# Patient Record
Sex: FEMALE | Race: ASIAN | NOT HISPANIC OR LATINO | Employment: FULL TIME | URBAN - METROPOLITAN AREA
[De-identification: names, ages, dates, MRNs, and addresses within clinical notes are randomized per-mention and may not be internally consistent; named-entity substitution may affect disease eponyms.]

---

## 2021-09-02 ENCOUNTER — TRANSFERRED RECORDS (OUTPATIENT)
Dept: HEALTH INFORMATION MANAGEMENT | Facility: CLINIC | Age: 31
End: 2021-09-02

## 2021-09-02 LAB
HPV ABSTRACT: NORMAL
PAP-ABSTRACT: NORMAL

## 2022-10-03 ENCOUNTER — HEALTH MAINTENANCE LETTER (OUTPATIENT)
Age: 32
End: 2022-10-03

## 2022-11-29 ENCOUNTER — OFFICE VISIT (OUTPATIENT)
Dept: FAMILY MEDICINE | Facility: CLINIC | Age: 32
End: 2022-11-29
Payer: COMMERCIAL

## 2022-11-29 VITALS
BODY MASS INDEX: 37.61 KG/M2 | HEIGHT: 61 IN | HEART RATE: 66 BPM | RESPIRATION RATE: 16 BRPM | SYSTOLIC BLOOD PRESSURE: 144 MMHG | DIASTOLIC BLOOD PRESSURE: 97 MMHG | OXYGEN SATURATION: 98 % | TEMPERATURE: 98.4 F | WEIGHT: 199.2 LBS

## 2022-11-29 DIAGNOSIS — I10 ESSENTIAL HYPERTENSION: ICD-10-CM

## 2022-11-29 DIAGNOSIS — E66.01 CLASS 2 SEVERE OBESITY WITH SERIOUS COMORBIDITY AND BODY MASS INDEX (BMI) OF 37.0 TO 37.9 IN ADULT, UNSPECIFIED OBESITY TYPE (H): Primary | ICD-10-CM

## 2022-11-29 DIAGNOSIS — E66.812 CLASS 2 SEVERE OBESITY WITH SERIOUS COMORBIDITY AND BODY MASS INDEX (BMI) OF 37.0 TO 37.9 IN ADULT, UNSPECIFIED OBESITY TYPE (H): Primary | ICD-10-CM

## 2022-11-29 LAB — HBA1C MFR BLD: 5.6 % (ref 0–5.6)

## 2022-11-29 PROCEDURE — 36415 COLL VENOUS BLD VENIPUNCTURE: CPT | Performed by: FAMILY MEDICINE

## 2022-11-29 PROCEDURE — 99205 OFFICE O/P NEW HI 60 MIN: CPT | Performed by: FAMILY MEDICINE

## 2022-11-29 PROCEDURE — 83036 HEMOGLOBIN GLYCOSYLATED A1C: CPT | Performed by: FAMILY MEDICINE

## 2022-11-29 RX ORDER — DILTIAZEM HYDROCHLORIDE 240 MG/1
240 CAPSULE, EXTENDED RELEASE ORAL DAILY
COMMUNITY
Start: 2021-07-07

## 2022-11-29 RX ORDER — TRAZODONE HYDROCHLORIDE 50 MG/1
TABLET, FILM COATED ORAL
COMMUNITY
Start: 2021-07-09 | End: 2023-04-24

## 2022-11-29 RX ORDER — CHLORTHALIDONE 25 MG/1
25 TABLET ORAL DAILY
COMMUNITY
Start: 2021-07-07 | End: 2023-04-24

## 2022-11-29 NOTE — PROGRESS NOTES
"    New Medical Weight Management Consult    PATIENT:  Fortunato Dukes  MRN:         [id number]  :         1990  ROSHAN:         2022    Primary PCP in  system,    I had the pleasure of seeing your patient, Fortunato Dukes. Full intake/assessment was done to determine barriers to weight loss success and develop a treatment plan. Fortunato Dukes is a 32 year old female interested in treatment of medical problems associated with excess weight. She has a height of 5' 1.25\", a weight of 199 lbs 3.2 oz, and the calculated Body mass index is 37.33 kg/m .    ASSESSMENT/PLAN:  1. Class 2 severe obesity with serious comorbidity and body mass index (BMI) of 37.0 to 37.9 in adult, unspecified obesity type (H)  Pa presents for comprehensive weight management intake today.  Her comorbidities include hypertension.  She was able to set some long-term and short-term goals as discussed below.  I did check an A1c and this was in the normal range.  We discussed medication options and as she has uncontrolled hypertension, phentermine would currently be contraindicated.  She is already taking Wellbutrin and does not want to increase the dose as she feels like it started giving her headaches.  I recommended a GLP-1 and will send in Saxenda as Wegovy is still on backorder.  Discussed potential adverse side effects including nausea and pancreatitis.  She will then follow-up in 4 to 6 weeks.  - Hemoglobin A1c; Future  - liraglutide - Weight Management (SAXENDA) 18 MG/3ML pen; Inject 0.6 mg Subcutaneous daily  Dispense: 9 mL; Refill: 0  - Hemoglobin A1c    2. Essential hypertension  This is managed by her PCP in the [facility].  They have been making some adjustments to her medications.    Pa has a long-term goal weight of 180 pounds.  She was able to set some short-term goals in the following categories:  Behavioral: She is going to start tracking her intake using a phone rex.  She is not getting adequate sleep and is working with " her primary care provider regarding some insomnia.  Nutritional: We discussed trying to keep carbs under 125 and proteins around 60-70.  She is currently coming in at about 1600 naresh/day and may need to cut that down more to 1400 if she wants to have more of a deficit.  Activity: Her activity level is excellent and no changes needed there      Pa [facility]  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  She has 6 children.  Her last delivery was in 2014.  She feels like that is when she gained some weight with that last pregnancy and just had a hard time getting it off.  She also lives with her parents.  She does the grocery shopping and the food prep.  She states that she had been on phentermine in the past and lost about 10 pounds with it.  She has been working on blood pressure medications with her primary care physician.  She started on Wellbutrin about 2 months ago for some depression and she feels like it gives her headaches.  As far as her dietary intake is as follows:  Breakfast: Coffee with protein  She usually skips lunch but may have some rice crackers  Dinner: Traditional Asian meal with rice, protein, veggies.  She states she is measuring her rice and limiting it to 1 cup.  Snacking: She states she does not do a lot of snacking but sometimes has a salt craving.  She has been tracking her intake on a calorie rex and is coming in around 1600 naresh/day.  However, she has been adding in the extra calories that they give her for her exercise.  She has too many sodas per week.  She does not drink alcohol  As far as activity level, [facility]  She will do 30 to 45 minutes of cardio and then has been doing some weight training as well.  She does not feel that she does a lot of disordered eating.  She screens negative for binge eating.  She is only getting about 6 hours of sleep per  "night.  She states this is mainly some insomnia and she has been working with her primary care physician.  She is currently taking trazodone.  She states if she goes to bed earlier she just wakes up earlier.  She does have some snoring but screens negative for sleep apnea otherwise.  She is not planning any further pregnancies at this time.  We discussed she needs to have reliable birth control while she is on any weight loss medications.  She has the following co-morbidities:       11/28/2022   I have the following health issues associated with obesity: High Blood Pressure   I have the following symptoms associated with obesity: Fatigue       Patient Goals 11/28/2022   I am interested in having a healthier weight to diminish current health problems: Yes   I am interested in having a healthier weight in order to prevent future health problems: Yes   I am interested in having a healthier weight in order to have a future surgery: No       Referring Provider 11/28/2022   Please name the provider who referred you to Medical Weight Management.  If you do not know, please answer: \"I Don't Know\". krystal gomez       Weight History 11/28/2022   How concerned are you about your weight? Somewhat Concerned   Would you describe your weight gain as gradual? Yes   I became overweight: As a Teenager   The following factors have contributed to my weight gain:  Mental Health Issues, Eating Wrong Types of Food, Genetic (Runs in the Family), Stress   I have tried the following methods to lose weight: Watching Portions or Calories, Exercise, Medications, Fasting   I have the following family history of obesity/being overweight:  One or more of my siblings are overweight   Has anyone in your family had weight loss surgery? Yes   How has your weight changed over the last year?  Gained   How many pounds? 20       Diet Recall Review with Patient 11/28/2022   Do you typically eat lunch? Yes   If you do eat lunch, what types of food do you " typically eat?  Chicken, rice, some vegetables, sandwiches, protein shake   Do you typically eat supper? Yes   If you do eat supper, what types of food do you typically eat? Rice, some protein meat, vegetables   Do you typically eat snacks? Yes   If you do snack, what types of food do you typically eat? Rice crackers, fruit, sometimes chips   Do you like vegetables?  Yes   Do you drink water? Yes   How many glasses of juice do you drink in a typical day? 1   How many of glasses of milk do you drink in a typical day? 1   If you do drink milk, what type? N/A   How many 8oz glasses of sugar containing drinks such as August-Aid/sweet tea do you drink in a day? 0   How many cans/bottles of sugar pop/soda/tea/sports drinks do you drink in a day? 1   How many cans/bottles of diet pop/soda/tea or sports drink do you drink in a day? 2   How often do you have a drink of alcohol? Monthly or Less       Eating Habits 11/28/2022   Generally, my meals include foods like these: bread, pasta, rice, potatoes, corn, crackers, sweet dessert, pop, or juice. Everyday   Generally, my meals include foods like these: fried meats, brats, burgers, french fries, pizza, cheese, chips, or ice cream. Once a Week   Eat fast food (like McDonalds, [employer], Taco Bell). Once a Week   Eat at a buffet or sit-down restaurant. Less Than Weekly   Eat most of my meals in front of the TV or computer. Almost Everyday   Often skip meals, eat at random times, have no regular eating times. A Few Times a Week   Rarely sit down for a meal but snack or graze throughout.  Once a Week   Eat extra snacks between meals. Once a Week   Eat most of my food at the end of the day. Once a Week   Eat in the middle of the night or wake up at night to eat. Never   Eat extra snacks to prevent or correct low blood sugar. Never   Eat to prevent acid reflux or stomach pain. Never   Worry about not having enough food to eat. Never   Have you been to the food shelf at least a few  times this year? No   I eat when I am depressed. Never   I eat when I am stressed. Never   I eat when I am bored. Less Than Weekly   I eat when I am anxious. Never   I eat when I am happy or as a reward. Never   I feel hungry all the time even if I just have eaten. A Few Times a Week   Feeling full is important to me. Less Than Weekly   I finish all the food on my plate even if I am already full. Never   I can't resist eating delicious food or walk past the good food/smell. Never   I eat/snack without noticing that I am eating. Never   I eat when I am preparing the meal. Less Than Weekly   I eat more than usual when I see others eating. Never   I have trouble not eating sweets, ice cream, cookies, or chips if they are around the house. Never   I think about food all day. Less Than Weekly   What foods, if any, do you crave? Chips/Crackers       Amount of Food 11/28/2022   I make myself vomit what I have eaten or use laxatives to get rid of food. Monthly   I eat a large amount of food, like a loaf of bread, a box of cookies, a pint/quart of ice cream, all at once. Never   I eat a large amount of food even when I am not hungry. Never   I eat rapidly. Never   I eat alone because I feel embarrassed and do not want others to see how much I have eaten. Never   I eat until I am uncomfortably full. Never   I feel bad, disgusted, or guilty after I overeat. Monthly   I make myself vomit what I have eaten or use laxatives to get rid of food. Monthly       Activity/Exercise History 11/28/2022   How much of a typical 12 hour day do you spend sitting? Less Than Half the Day   How much of a typical 12 hour day do you spend lying down? Less Than Half the Day   How much of a typical day do you spend walking/standing? Half the Day   How many hours (not including work) do you spend on the TV/Video Games/Computer/Tablet/Phone? 2-3 Hours   How many times a week are you active for the purpose of exercise? 4-5 TImes a Week   What keeps you  from being more active? Lack of Time   How many total minutes do you spend doing some activity for the purpose of exercising when you exercise? More Than 30 Minutes       PAST MEDICAL HISTORY:  No past medical history on file.    Work/Social History Reviewed With Patient 11/28/2022   My employment status is: Full-Time   My job is: MA   How much of your job is spent on the computer or phone? 75%   How many hours do you spend commuting to work daily?  1 hour   What is your marital status? /In a Relationship   If in a relationship, is your significant other overweight? No   Do you have children? Yes   If you have children, are they overweight? Yes   Who do you live with?  Both parents (me)   Are they supportive of your health goals? Yes   Who does the food shopping?  Myself       Mental Health History Reviewed With Patient 11/28/2022   Have you ever been physically or sexually abused? No   If yes, do you feel that the abuse is affecting your weight? No   If yes, would you like to talk to a counselor about the abuse? No   How often in the past 2 weeks have you felt little interest or pleasure in doing things? For Several Days   Over the past 2 weeks how often have you felt down, depressed, or hopeless? For Several Days       Sleep History Reviewed With Patient 11/28/2022   How many hours do you sleep at night? 6   Do you think that you snore loudly or has anybody ever heard you snore loudly (louder than talking or so loud it can be heard behind a shut door)? Yes   Has anyone seen or heard you stop breathing during your sleep? No   Do you often feel tired, fatigued, or sleepy during the day? Yes   Do you have a TV/Computer in your bedroom? No       MEDICATIONS:   Current Outpatient Medications   Medication Sig Dispense Refill     buPROPion HCl (WELLBUTRIN PO) Unsure of dose       chlorthalidone (HYGROTON) 25 MG tablet Take 25 mg by mouth daily       diltiazem ER (DILT-XR) 240 MG 24 hr ER beaded capsule Take 240 mg  "by mouth daily       traZODone (DESYREL) 50 MG tablet As needed         ALLERGIES:   No Known Allergies    PHYSICAL EXAM:  BP (!) 144/97   Pulse 66   Temp 98.4  F (36.9  C)   Resp 16   Ht 1.556 m (5' 1.25\")   Wt 90.4 kg (199 lb 3.2 oz)   LMP  (LMP Unknown)   SpO2 98%   BMI 37.33 kg/m      Waist circumference:      Wt Readings from Last 4 Encounters:   11/29/22 90.4 kg (199 lb 3.2 oz)     A & O x 3  HEENT: NCAT, mucous membranes moist  Respirations unlabored  Location of obesity: Mixed Obesity    FOLLOW-UP:   6 weeks.    TIME: 65 min spent on evaluation, management, counseling, education, & motivational interviewing with greater than 50 % of the total time was spent on counseling and coordinating care    Sincerely,    Karla Nix MD      "

## 2022-11-30 DIAGNOSIS — E66.812 CLASS 2 SEVERE OBESITY WITH SERIOUS COMORBIDITY AND BODY MASS INDEX (BMI) OF 37.0 TO 37.9 IN ADULT, UNSPECIFIED OBESITY TYPE (H): ICD-10-CM

## 2022-11-30 DIAGNOSIS — E66.01 CLASS 2 SEVERE OBESITY WITH SERIOUS COMORBIDITY AND BODY MASS INDEX (BMI) OF 37.0 TO 37.9 IN ADULT, UNSPECIFIED OBESITY TYPE (H): ICD-10-CM

## 2022-12-05 ENCOUNTER — TELEPHONE (OUTPATIENT)
Dept: FAMILY MEDICINE | Facility: CLINIC | Age: 32
End: 2022-12-05

## 2022-12-05 NOTE — TELEPHONE ENCOUNTER
PA needed for Saxenda 6mg/ml - 0.6mg subcutaneous once daily - 15ml    [facility]  ID: [id number]    [phone]

## 2022-12-06 NOTE — TELEPHONE ENCOUNTER
Prior Authorization Approval    Authorization Effective Date: 11/6/2022  Authorization Expiration Date: 4/5/2023  Medication: SAXENDA 18 MG/3ML pen- APPROVED   Approved Dose/Quantity:   Reference #:     Insurance Company: [employer] - Phone [phone] Fax [phone]  Expected CoPay:        CoPay Card Available:      Foundation Assistance Needed:    Which Pharmacy is filling the prescription (Not needed for infusion/clinic administered): [facility] [address] [place] [address]  Pharmacy Notified: Yes  Patient Notified:  **Instructed pharmacy to notify patient when script is ready to /ship.**

## 2023-01-10 ENCOUNTER — OFFICE VISIT (OUTPATIENT)
Dept: FAMILY MEDICINE | Facility: CLINIC | Age: 33
End: 2023-01-10
Payer: COMMERCIAL

## 2023-01-10 VITALS
DIASTOLIC BLOOD PRESSURE: 93 MMHG | BODY MASS INDEX: 37.46 KG/M2 | HEART RATE: 66 BPM | RESPIRATION RATE: 16 BRPM | SYSTOLIC BLOOD PRESSURE: 141 MMHG | HEIGHT: 61 IN | TEMPERATURE: 98.7 F | WEIGHT: 198.4 LBS | OXYGEN SATURATION: 99 %

## 2023-01-10 DIAGNOSIS — E66.01 CLASS 2 SEVERE OBESITY WITH SERIOUS COMORBIDITY AND BODY MASS INDEX (BMI) OF 37.0 TO 37.9 IN ADULT, UNSPECIFIED OBESITY TYPE (H): ICD-10-CM

## 2023-01-10 DIAGNOSIS — E66.812 CLASS 2 SEVERE OBESITY WITH SERIOUS COMORBIDITY AND BODY MASS INDEX (BMI) OF 37.0 TO 37.9 IN ADULT, UNSPECIFIED OBESITY TYPE (H): ICD-10-CM

## 2023-01-10 PROCEDURE — 99214 OFFICE O/P EST MOD 30 MIN: CPT | Performed by: FAMILY MEDICINE

## 2023-01-10 RX ORDER — BUPROPION HYDROCHLORIDE 150 MG/1
150 TABLET ORAL EVERY MORNING
COMMUNITY
Start: 2023-01-02 | End: 2023-04-24

## 2023-01-10 NOTE — PROGRESS NOTES
"    Return Medical Weight Management Note     Fortunato Dukes  MRN:  [id number]  :  1990  ROSHAN:  1/10/2023           2022   I have the following health issues associated with obesity: High Blood Pressure   I have the following symptoms associated with obesity: Fatigue       INTERVAL HISTORY:  Pa presents for comprehensive weight management follow-up.  When I saw her at the end of November she weighed 199 and today she is 199 again.  She was able to get Saxenda covered and is at the 2.4 her milligrams dose.  She has felt like it is helped with cravings.  She has been tracking sometimes, but not on a regular basis.  She is overall trying to cut back on her rice.  She admits through the holidays she was eating more sweets.  She is now working out regularly.  She is not having any nausea from the medication.  She really has not noticed a whole lot of difference as of yet.  We discussed staying at the max dose for 12 weeks to see if she has a response.    CURRENT WEIGHT:   198 lbs 6.4 oz                  No flowsheet data found.    VITALS:  BP (!) 141/93   Pulse 66   Temp 98.7  F (37.1  C)   Resp 16   Ht 1.556 m (5' 1.25\")   Wt 90 kg (198 lb 6.4 oz)   LMP  (LMP Unknown)   SpO2 99%   BMI 37.18 kg/m      MEDICATIONS:   Current Outpatient Medications   Medication Sig Dispense Refill     liraglutide - Weight Management (SAXENDA) 18 MG/3ML pen Inject 3 mg Subcutaneous daily 45 mL 0     buPROPion (WELLBUTRIN XL) 150 MG 24 hr tablet 150 mg every morning       chlorthalidone (HYGROTON) 25 MG tablet Take 25 mg by mouth daily       diltiazem ER (DILT-XR) 240 MG 24 hr ER beaded capsule Take 240 mg by mouth daily       insulin pen needle (32G X 6 MM) 32G X 6 MM miscellaneous Use one  pen needles daily or as directed. 90 each 3     traZODone (DESYREL) 50 MG tablet As needed         No flowsheet data found.    ASSESSMENT/Plan  1. Class 2 severe obesity with serious comorbidity and body mass index (BMI) of 37.0 to 37.9 " in adult, unspecified obesity type (H)  Pa was able to get Saxenda covered.  We will increase to the maximum dose and have her follow-up in 8 weeks.  She will continue to focus on diet and activity.  - liraglutide - Weight Management (SAXENDA) 18 MG/3ML pen; Inject 3 mg Subcutaneous daily  Dispense: 45 mL; Refill: 0        FOLLOW-UP:    8 weeks.        Sincerely,    Karla Nix MD

## 2023-03-13 ENCOUNTER — OFFICE VISIT (OUTPATIENT)
Dept: FAMILY MEDICINE | Facility: CLINIC | Age: 33
End: 2023-03-13
Payer: COMMERCIAL

## 2023-03-13 VITALS
BODY MASS INDEX: 37.76 KG/M2 | SYSTOLIC BLOOD PRESSURE: 142 MMHG | RESPIRATION RATE: 16 BRPM | WEIGHT: 200 LBS | HEIGHT: 61 IN | HEART RATE: 65 BPM | OXYGEN SATURATION: 100 % | DIASTOLIC BLOOD PRESSURE: 85 MMHG

## 2023-03-13 DIAGNOSIS — E66.812 CLASS 2 SEVERE OBESITY WITH SERIOUS COMORBIDITY AND BODY MASS INDEX (BMI) OF 37.0 TO 37.9 IN ADULT, UNSPECIFIED OBESITY TYPE (H): Primary | ICD-10-CM

## 2023-03-13 DIAGNOSIS — E66.01 CLASS 2 SEVERE OBESITY WITH SERIOUS COMORBIDITY AND BODY MASS INDEX (BMI) OF 37.0 TO 37.9 IN ADULT, UNSPECIFIED OBESITY TYPE (H): Primary | ICD-10-CM

## 2023-03-13 PROCEDURE — 99214 OFFICE O/P EST MOD 30 MIN: CPT | Performed by: FAMILY MEDICINE

## 2023-03-13 NOTE — PROGRESS NOTES
"    Return Medical Weight Management Note     Fortunato Dukes  MRN:  [id number]  :  1990  ROSHAN:  3/13/2023        I had the pleasure of seeing  Fortunato Dukes. She is a 32 year old female who I am continuing to see for treatment of obesity related to:       2022   I have the following health issues associated with obesity: High Blood Pressure   I have the following symptoms associated with obesity: Fatigue       INTERVAL HISTORY:  Pa returns for comprehensive weight management follow-up.  Her initial weight when I first started seeing her in November was 199.  When I saw her last time she was 199 and today she is 200.  She is little bit frustrated by this as she feels like she is doing all the right things.  She feels like her nutrition is pretty good.  She is doing a protein shake in the morning and then just some raw vegetables during the day.  She will then have her main meal of the day when she gets home.  This tends to be a traditional  meal but she is really cut back on the rice.  She has been working out quite frequently and actually started to do some weight lifting.  We discussed this could account for the lack of weight loss on the scale.  She does feel like her clothes are little bit looser and people are commenting that she is losing weight.  She is now been on the maximum dose of Saxenda for 6 weeks and did not have any additional weight loss.  In the past she is phentermine but as she has high blood pressure, would not recommend that at this time.  As we have better supply chain on the Wegovy it might be worth switching over if her insurance covers it to see if she has more success with this.    CURRENT WEIGHT:   200 lbs 0 oz  Wt Readings from Last 4 Encounters:   23 90.7 kg (200 lb)   01/10/23 90 kg (198 lb 6.4 oz)   22 90.4 kg (199 lb 3.2 oz)                   No flowsheet data found.    VITALS:  BP (!) 142/85   Pulse 65   Resp 16   Ht 1.556 m (5' 1.25\")   Wt 90.7 kg (200 lb) "   LMP  (LMP Unknown)   SpO2 100%   BMI 37.48 kg/m      MEDICATIONS:   Current Outpatient Medications   Medication Sig Dispense Refill     Semaglutide-Weight Management (WEGOVY) 1 MG/0.5ML pen Inject 1 mg Subcutaneous once a week 2 mL 1     buPROPion (WELLBUTRIN XL) 150 MG 24 hr tablet 150 mg every morning       chlorthalidone (HYGROTON) 25 MG tablet Take 25 mg by mouth daily       diltiazem ER (DILT-XR) 240 MG 24 hr ER beaded capsule Take 240 mg by mouth daily       insulin pen needle (32G X 6 MM) 32G X 6 MM miscellaneous Use one  pen needles daily or as directed. 90 each 3     liraglutide - Weight Management (SAXENDA) 18 MG/3ML pen Inject 3 mg Subcutaneous daily 45 mL 0     traZODone (DESYREL) 50 MG tablet As needed         No flowsheet data found.    ASSESSMENT/Plan  1. Class 2 severe obesity with serious comorbidity and body mass index (BMI) of 37.0 to 37.9 in adult, unspecified obesity type (H)  As she did not have any weight loss with the maximum dose of Saxenda for 6 weeks, will see if we can switch over to Wegovy if her insurance covers it.  She is made a lot of lifestyle change with her nutrition and physical activity.  We will plan to follow-up in about 6 weeks.  - Semaglutide-Weight Management (WEGOVY) 1 MG/0.5ML pen; Inject 1 mg Subcutaneous once a week  Dispense: 2 mL; Refill: 1      FOLLOW-UP:    6 weeks.        Sincerely,    Karla Nix MD

## 2023-03-14 ENCOUNTER — TELEPHONE (OUTPATIENT)
Dept: FAMILY MEDICINE | Facility: CLINIC | Age: 33
End: 2023-03-14
Payer: COMMERCIAL

## 2023-03-14 NOTE — TELEPHONE ENCOUNTER
PA needed for Wegovy 1mg/0.5ml auto-injectors    [facility]MyMeds  [facility]  [place]    [place]: [id number]

## 2023-03-16 NOTE — TELEPHONE ENCOUNTER
Central Prior Authorization Team   Phone: [phone]    PA Initiation    Medication: Wegovy 1MG/0.5ML auto-injectors  Insurance Company: [employer] - Phone [phone] Fax [phone]  Pharmacy Filling the Rx: [facility] [address] [place] [address]  Filling Pharmacy Phone: [phone]  Filling Pharmacy Fax:    Start Date: 3/16/2023

## 2023-03-20 NOTE — TELEPHONE ENCOUNTER
Prior Authorization Approval    Authorization Effective Date: 2/14/2023  Authorization Expiration Date: 10/12/2023  Medication:   Approved Dose/Quantity:    Reference #:     Insurance Company: [employer] - Phone [phone] Fax [phone]  Expected CoPay:       CoPay Card Available:      Foundation Assistance Needed:    Which Pharmacy is filling the prescription (Not needed for infusion/clinic administered): [facility] [address] [place] [address]  Pharmacy Notified: Yes  Patient Notified: Comment:  Pharmacy will notify pateint when medication is ready for pickup

## 2023-04-24 ENCOUNTER — OFFICE VISIT (OUTPATIENT)
Dept: FAMILY MEDICINE | Facility: CLINIC | Age: 33
End: 2023-04-24
Payer: COMMERCIAL

## 2023-04-24 VITALS
TEMPERATURE: 99.1 F | OXYGEN SATURATION: 99 % | HEIGHT: 61 IN | BODY MASS INDEX: 37.49 KG/M2 | WEIGHT: 198.6 LBS | SYSTOLIC BLOOD PRESSURE: 140 MMHG | HEART RATE: 67 BPM | RESPIRATION RATE: 22 BRPM | DIASTOLIC BLOOD PRESSURE: 87 MMHG

## 2023-04-24 DIAGNOSIS — E66.812 CLASS 2 SEVERE OBESITY WITH SERIOUS COMORBIDITY AND BODY MASS INDEX (BMI) OF 37.0 TO 37.9 IN ADULT, UNSPECIFIED OBESITY TYPE (H): ICD-10-CM

## 2023-04-24 DIAGNOSIS — E66.01 CLASS 2 SEVERE OBESITY WITH SERIOUS COMORBIDITY AND BODY MASS INDEX (BMI) OF 37.0 TO 37.9 IN ADULT, UNSPECIFIED OBESITY TYPE (H): ICD-10-CM

## 2023-04-24 PROCEDURE — 99214 OFFICE O/P EST MOD 30 MIN: CPT | Performed by: FAMILY MEDICINE

## 2023-04-24 RX ORDER — INDAPAMIDE 2.5 MG/1
1 TABLET ORAL DAILY
COMMUNITY
Start: 2023-03-06

## 2023-04-24 NOTE — PROGRESS NOTES
"    Return Medical Weight Management Note     Fortunato Dukes  MRN:  [id number]  :  1990  ROSHAN:  2023    Dear Karla Nix MD,    I had the pleasure of seeing your patient Fortunato Dukes. She is a 32 year old female who I am continuing to see for treatment of obesity related to:        2022    10:50 PM   --   I have the following health issues associated with obesity High Blood Pressure   I have the following symptoms associated with obesity Fatigue       INTERVAL HISTORY:  Pa returns for comprehensive weight management follow-up.  Her initial intake weight in November was 199.  When I saw her last month she was at 200.  She did not have any success with the maximum dose of Saxenda.  Therefore, we just recently switched her over to Wegovy.  She is only at the 0.5 mg dose but feels like already its been helping with her appetite.  She feels like she getting soria faster.  She has had a little bit of nausea but not severe.  She still exercising regularly at the gym.  She is hopeful that the Wegovy is going to be a more helpful than the Saxenda was.  She feels like she is still really doing well with her nutrition.    CURRENT WEIGHT:   198 lbs 9.6 oz                       View : No data to display.                VITALS:  BP (!) 140/87   Pulse 67   Temp 99.1  F (37.3  C) (Oral)   Resp 22   Ht 1.549 m (5' 1\")   Wt 90.1 kg (198 lb 9.6 oz)   LMP  (LMP Unknown)   SpO2 99%   BMI 37.53 kg/m      MEDICATIONS:   Current Outpatient Medications   Medication Sig Dispense Refill     diltiazem ER (DILT-XR) 240 MG 24 hr ER beaded capsule Take 240 mg by mouth daily       indapamide (LOZOL) 2.5 MG tablet Take 1 tablet by mouth daily       Semaglutide-Weight Management (WEGOVY) 1 MG/0.5ML pen Inject 1 mg Subcutaneous once a week 2 mL 1            View : No data to display.                ASSESSMENT/Plan   1. Class 2 severe obesity with serious comorbidity and body mass index (BMI) of 37.0 to 37.9 in adult, " unspecified obesity type (H)  She is just getting started on the Wegovy.  After 4 doses of the 0.5 mg, will increase to 1 mg.  She will then follow-up with me in about 8 weeks.  She will let me know if she needs a refill in the meantime what dose she will be needing.  She can increase to 1.7 after 4 weeks if she would like.  - Semaglutide-Weight Management (WEGOVY) 1 MG/0.5ML pen; Inject 1 mg Subcutaneous once a week  Dispense: 2 mL; Refill: 1        FOLLOW-UP:    8 weeks.        Sincerely,    Karla Nix MD

## 2023-05-30 ENCOUNTER — TELEPHONE (OUTPATIENT)
Dept: FAMILY MEDICINE | Facility: CLINIC | Age: 33
End: 2023-05-30
Payer: COMMERCIAL

## 2023-05-30 DIAGNOSIS — E66.01 CLASS 2 SEVERE OBESITY WITH SERIOUS COMORBIDITY AND BODY MASS INDEX (BMI) OF 37.0 TO 37.9 IN ADULT, UNSPECIFIED OBESITY TYPE (H): Primary | ICD-10-CM

## 2023-05-30 DIAGNOSIS — E66.812 CLASS 2 SEVERE OBESITY WITH SERIOUS COMORBIDITY AND BODY MASS INDEX (BMI) OF 37.0 TO 37.9 IN ADULT, UNSPECIFIED OBESITY TYPE (H): Primary | ICD-10-CM

## 2023-05-30 NOTE — TELEPHONE ENCOUNTER
Patient PA  on 23.   Please submit a new PA.     Semaglutide-Weight Management (WEGOVY) 1 MG/0.5ML pen 2 mL 1 2023

## 2023-06-03 NOTE — TELEPHONE ENCOUNTER
The current prior authorization on file for the Wegovy only allows for 2 refills at the 1 mg dose which were filled 3/20/23 and 4/10/23. The 1.7 mg strength is covered with a $75 co-pay. If provider doesn't wish to go up in strength, I will call insurance to try to get a repeat dose override because it won't let me start a PA on CMM.     Thank you,     Flex H[place], [facility]  Pharmacy Clinic [facility]   [email or web address]   Phone: [phone]  Fax: [phone]

## 2023-06-05 NOTE — TELEPHONE ENCOUNTER
Called and spoke with patient, explained the situation with the prior authorization.  Patient is willing to go up to the 1.7 mg dosage.  She said that she has not had any noticeable side effects with the 1 mg dosage.    Please send to [facility] [address] in [place] RN     [facility]

## 2023-06-05 NOTE — TELEPHONE ENCOUNTER
Please check with patient and let her know of the situation.  See if she is tolerating the 1 mg and is willing to go up to the 1.7.  If she is having a lot of nausea still, then may be we might have to try to get an exception.

## 2023-06-19 ENCOUNTER — OFFICE VISIT (OUTPATIENT)
Dept: FAMILY MEDICINE | Facility: CLINIC | Age: 33
End: 2023-06-19
Payer: COMMERCIAL

## 2023-06-19 VITALS
SYSTOLIC BLOOD PRESSURE: 137 MMHG | WEIGHT: 199.13 LBS | HEIGHT: 61 IN | TEMPERATURE: 98.6 F | DIASTOLIC BLOOD PRESSURE: 94 MMHG | BODY MASS INDEX: 37.59 KG/M2 | OXYGEN SATURATION: 99 % | RESPIRATION RATE: 20 BRPM | HEART RATE: 71 BPM

## 2023-06-19 DIAGNOSIS — E66.812 CLASS 2 SEVERE OBESITY WITH SERIOUS COMORBIDITY AND BODY MASS INDEX (BMI) OF 37.0 TO 37.9 IN ADULT, UNSPECIFIED OBESITY TYPE (H): Primary | ICD-10-CM

## 2023-06-19 DIAGNOSIS — E66.01 CLASS 2 SEVERE OBESITY WITH SERIOUS COMORBIDITY AND BODY MASS INDEX (BMI) OF 37.0 TO 37.9 IN ADULT, UNSPECIFIED OBESITY TYPE (H): Primary | ICD-10-CM

## 2023-06-19 PROCEDURE — 99214 OFFICE O/P EST MOD 30 MIN: CPT | Performed by: FAMILY MEDICINE

## 2023-06-19 NOTE — PROGRESS NOTES
"    Return Medical Weight Management Note     Fortunato Dukes  MRN:  [id number]  :  1990  ROSHAN:  2023        I had the pleasure of seeing your patient Fortunato Dukes. She is a 33 year old female who I am continuing to see for treatment of obesity related to:        2022    10:50 PM   --   I have the following health issues associated with obesity High Blood Pressure   I have the following symptoms associated with obesity Fatigue       INTERVAL HISTORY:  Pa returns for comprehensive weight management follow-up.  When I saw her in November for her intake she weighed 199.  When I saw her last time she was 209 and today she is 199.  She did not respond to the maximum dose of Saxenda for at least 12 weeks.  Therefore, we transitioned her over to Wegovy.  She is now down 3 injections of the 1.7 mg and so we will increase to the 2.4 mg.  So far, she has not experienced any weight loss.  She was without the medication for about 2 weeks due to supply chain issues and she felt like during those 2 weeks she had a lot of cravings and felt like she was eating a lot.  She admits that she has had a little bit more of a sweet tooth lately and has been doing many cans of soda.  She is been trying to be really good about her proteins and decrease her carbs.  She still exercising almost every day.  She is a little bit frustrated that she has not seen a lot of results.    CURRENT WEIGHT:   199 lbs 2 oz                       View : No data to display.                VITALS:  BP (!) 137/94 (BP Location: Left arm, Patient Position: Sitting, Cuff Size: Adult Regular)   Pulse 71   Temp 98.6  F (37  C) (Oral)   Resp 20   Ht 1.549 m (5' 1\")   Wt 90.3 kg (199 lb 2 oz)   SpO2 99%   BMI 37.62 kg/m      MEDICATIONS:   Current Outpatient Medications   Medication Sig Dispense Refill     diltiazem ER (DILT-XR) 240 MG 24 hr ER beaded capsule Take 240 mg by mouth daily       indapamide (LOZOL) 2.5 MG tablet Take 1 tablet by mouth daily "       Semaglutide-Weight Management (WEGOVY) 1.7 MG/0.75ML pen Inject 1.7 mg Subcutaneous once a week 3 mL 0     Semaglutide-Weight Management (WEGOVY) 2.4 MG/0.75ML pen Inject 2.4 mg Subcutaneous once a week 3 mL 3            View : No data to display.                ASSESSMENT/Plan  1. Class 2 severe obesity with serious comorbidity and body mass index (BMI) of 37.0 to 37.9 in adult, unspecified obesity type (H)  Pa has been taking the 1.7 mg of Wegovy for about 3 weeks.  After the fourth week, will increase to 2.4 mg.  She did not have much response to the maximum dose of Saxenda so we switched over to Wegovy.  So far she has not had any weight loss.  She states her activity levels been good.  She tried to focus on proteins for diet but has been drinking a can of soda per day which she plans on stopping.  We will then plan to follow-up in about 12 weeks.  - Semaglutide-Weight Management (WEGOVY) 2.4 MG/0.75ML pen; Inject 2.4 mg Subcutaneous once a week  Dispense: 3 mL; Refill: 3      FOLLOW-UP:    12 weeks.        Sincerely,    Karla Nix MD

## 2023-09-18 ENCOUNTER — OFFICE VISIT (OUTPATIENT)
Dept: FAMILY MEDICINE | Facility: CLINIC | Age: 33
End: 2023-09-18
Payer: COMMERCIAL

## 2023-09-18 VITALS
DIASTOLIC BLOOD PRESSURE: 85 MMHG | BODY MASS INDEX: 36.44 KG/M2 | SYSTOLIC BLOOD PRESSURE: 134 MMHG | HEIGHT: 61 IN | HEART RATE: 57 BPM | OXYGEN SATURATION: 99 % | RESPIRATION RATE: 16 BRPM | WEIGHT: 193 LBS

## 2023-09-18 DIAGNOSIS — E66.812 CLASS 2 SEVERE OBESITY WITH SERIOUS COMORBIDITY AND BODY MASS INDEX (BMI) OF 36.0 TO 36.9 IN ADULT, UNSPECIFIED OBESITY TYPE (H): ICD-10-CM

## 2023-09-18 DIAGNOSIS — E66.01 CLASS 2 SEVERE OBESITY WITH SERIOUS COMORBIDITY AND BODY MASS INDEX (BMI) OF 36.0 TO 36.9 IN ADULT, UNSPECIFIED OBESITY TYPE (H): ICD-10-CM

## 2023-09-18 PROCEDURE — 99214 OFFICE O/P EST MOD 30 MIN: CPT | Performed by: FAMILY MEDICINE

## 2023-09-18 NOTE — PROGRESS NOTES
"    Return Medical Weight Management Note     Fortunato Dukes  MRN:  [id number]  :  1990  ROSHAN:  2023    Dear Karla Nix MD,    I had the pleasure of seeing your patient Fortunato Dukes. She is a 33 year old female who I am continuing to see for treatment of obesity related to:        2022    10:50 PM   --   I have the following health issues associated with obesity High Blood Pressure   I have the following symptoms associated with obesity Fatigue       INTERVAL HISTORY:  Pa returns for comprehensive weight management follow-up.  Her intake weight in November was 191.  She was started on Saxenda and was on the maximum dose of Saxenda for several months and really did not lose any weight.  Therefore, once we could get it and supply, she switched over to Wegovy.  She is now been at the 2.4 mg dosage.  She is down to 193.  This is the first time we have seen the scale budge so she is feeling good about it.  She is good about her workouts, is working out regularly.  She has a little bit of nausea the first day or 2 with her injection but this resolves.  She does feel like the benefits outweigh the side effects.  She feels like she gets soria faster and has less hunger.    CURRENT WEIGHT:   193 lbs 0 oz                       No data to display                VITALS:  /85   Pulse 57   Resp 16   Ht 1.549 m (5' 1\")   Wt 87.5 kg (193 lb)   SpO2 99%   BMI 36.47 kg/m      MEDICATIONS:   Current Outpatient Medications   Medication Sig Dispense Refill    diltiazem ER (DILT-XR) 240 MG 24 hr ER beaded capsule Take 240 mg by mouth daily      indapamide (LOZOL) 2.5 MG tablet Take 1 tablet by mouth daily      Semaglutide-Weight Management (WEGOVY) 2.4 MG/0.75ML pen Inject 2.4 mg Subcutaneous once a week 3 mL 3            No data to display                ASSESSMENT:   .1. Class 2 severe obesity with serious comorbidity and body mass index (BMI) of 36.0 to 36.9 in adult, unspecified obesity type (H)  Pa has " now had some weight loss with the maximum dose of Wegovy.  She is good about her activity level and she is try to make healthier choices.  We will keep her on the current dose and follow-up in 12 weeks.  - Semaglutide-Weight Management (WEGOVY) 2.4 MG/0.75ML pen; Inject 2.4 mg Subcutaneous once a week  Dispense: 3 mL; Refill: 3          FOLLOW-UP:    12 weeks.        Sincerely,    Karla Nix MD

## 2023-10-22 ENCOUNTER — HEALTH MAINTENANCE LETTER (OUTPATIENT)
Age: 33
End: 2023-10-22

## 2023-10-24 ENCOUNTER — TELEPHONE (OUTPATIENT)
Dept: FAMILY MEDICINE | Facility: CLINIC | Age: 33
End: 2023-10-24
Payer: COMMERCIAL

## 2023-10-24 NOTE — TELEPHONE ENCOUNTER
PA needed for new dose of Wegovy 2.4mg/0.75ml    Covermymeds  Key - [id number]    [facility] - Phechristi Hernandez

## 2023-10-26 NOTE — TELEPHONE ENCOUNTER
Prior Authorization Approval    Authorization Effective Date: 9/26/2023  Authorization Expiration Date: 5/23/2024  Medication: Wegovy 2.4MG/0.75ML auto-injectors    Approved Dose/Quantity:   Reference #:     Insurance Company: [employer] - Phone [phone] Fax [phone]  Expected CoPay:       CoPay Card Available:      Foundation Assistance Needed:    Which Pharmacy is filling the prescription (Not needed for infusion/clinic administered): [facility] [address] [place] [address]  Pharmacy Notified:  yes  Patient Notified:  yes- Pharmacy will contact patient when ready to /ship

## 2023-10-26 NOTE — TELEPHONE ENCOUNTER
Central Prior Authorization Team   Phone: [phone]    PA Initiation    Medication: Wegovy 2.4MG/0.75ML auto-injectors    Insurance Company: [employer] - Phone [phone] Fax [phone]  Pharmacy Filling the Rx: [facility] [address] [place] [address]  Filling Pharmacy Phone: [phone]  Filling Pharmacy Fax:    Start Date: 10/26/2023

## 2023-12-19 ENCOUNTER — OFFICE VISIT (OUTPATIENT)
Dept: FAMILY MEDICINE | Facility: CLINIC | Age: 33
End: 2023-12-19
Payer: COMMERCIAL

## 2023-12-19 VITALS
HEART RATE: 61 BPM | OXYGEN SATURATION: 100 % | WEIGHT: 190 LBS | HEIGHT: 61 IN | BODY MASS INDEX: 35.87 KG/M2 | SYSTOLIC BLOOD PRESSURE: 137 MMHG | DIASTOLIC BLOOD PRESSURE: 89 MMHG | RESPIRATION RATE: 16 BRPM

## 2023-12-19 DIAGNOSIS — E66.01 CLASS 2 SEVERE OBESITY WITH SERIOUS COMORBIDITY AND BODY MASS INDEX (BMI) OF 35.0 TO 35.9 IN ADULT, UNSPECIFIED OBESITY TYPE (H): Primary | ICD-10-CM

## 2023-12-19 DIAGNOSIS — E66.812 CLASS 2 SEVERE OBESITY WITH SERIOUS COMORBIDITY AND BODY MASS INDEX (BMI) OF 35.0 TO 35.9 IN ADULT, UNSPECIFIED OBESITY TYPE (H): Primary | ICD-10-CM

## 2023-12-19 PROCEDURE — 99214 OFFICE O/P EST MOD 30 MIN: CPT | Performed by: FAMILY MEDICINE

## 2023-12-19 RX ORDER — PHENTERMINE HYDROCHLORIDE 37.5 MG/1
37.5 TABLET ORAL
Qty: 30 TABLET | Refills: 1 | Status: SHIPPED | OUTPATIENT
Start: 2023-12-19 | End: 2024-03-11

## 2023-12-19 NOTE — PROGRESS NOTES
"    Return Medical Weight Management Note     Fortunato Dukes  MRN:  [id number]  :  1990  ROSHAN:  2023    Dear Karla Nix MD,    I had the pleasure of seeing your patient Fortunato Dukes. She is a 33 year old female who I am continuing to see for treatment of obesity related to:        2022    10:50 PM   --   I have the following health issues associated with obesity High Blood Pressure   I have the following symptoms associated with obesity Fatigue       INTERVAL HISTORY:  Pa returns for comprehensive weight management follow-up.  Her intake weight in 2022 was 191.  She was on Saxenda maximum dose without any weight loss.  When I saw her in  she was up to 199 and she was able to start Wegovy.  When I saw her last she was at 193 and today she is at 190.  She has lost about 9 pounds since .  She feels like she is kind of stuck at a plateau.  She feels like the week of her menses she is more hungry and feels like she needs to eat more.  Her exercise is really good, she is exercising regularly both cardio and strength training.  We discussed her nutrition is the most important thing at this point as she has hit a plateau.  We talked about possibly tracking.  In the past she did take phentermine and lost about 10 pounds with it.  We discussed possibly adding half a tab to see if this will help with some of her hunger.  Her blood pressure is now under better control.    CURRENT WEIGHT:   190 lbs 0 oz                       No data to display                VITALS:  /89   Pulse 61   Resp 16   Ht 1.549 m (5' 1\")   Wt 86.2 kg (190 lb)   LMP  (LMP Unknown)   SpO2 100%   BMI 35.90 kg/m      MEDICATIONS:   Current Outpatient Medications   Medication Sig Dispense Refill    phentermine (ADIPEX-P) 37.5 MG tablet Take 1 tablet (37.5 mg) by mouth every morning (before breakfast) 30 tablet 1    Semaglutide-Weight Management (WEGOVY) 2.4 MG/0.75ML pen Inject 2.4 mg Subcutaneous once a week 3 mL " 3    diltiazem ER (DILT-XR) 240 MG 24 hr ER beaded capsule Take 240 mg by mouth daily      indapamide (LOZOL) 2.5 MG tablet Take 1 tablet by mouth daily              No data to display                ASSESSMENT:   1. Class 2 severe obesity with serious comorbidity and body mass index (BMI) of 35.0 to 35.9 in adult, unspecified obesity type (H)  She has had about a 9 pound weight loss with the Wegovy at maximum dose and has now had a plateau.  Her activity level is good.  She will try to take a closer look at her nutrition.  Will add a half a tab of phentermine to see if this helps with her ongoing hunger.  Follow-up in 12 weeks.  - Semaglutide-Weight Management (WEGOVY) 2.4 MG/0.75ML pen; Inject 2.4 mg Subcutaneous once a week  Dispense: 3 mL; Refill: 3  - phentermine (ADIPEX-P) 37.5 MG tablet; Take 1 tablet (37.5 mg) by mouth every morning (before breakfast)  Dispense: 30 tablet; Refill: 1        FOLLOW-UP:    12 weeks.        Sincerely,    Karla Nix MD

## 2023-12-21 ENCOUNTER — TELEPHONE (OUTPATIENT)
Dept: FAMILY MEDICINE | Facility: CLINIC | Age: 33
End: 2023-12-21
Payer: COMMERCIAL

## 2023-12-27 DIAGNOSIS — E66.812 CLASS 2 SEVERE OBESITY WITH SERIOUS COMORBIDITY AND BODY MASS INDEX (BMI) OF 35.0 TO 35.9 IN ADULT, UNSPECIFIED OBESITY TYPE (H): ICD-10-CM

## 2023-12-27 DIAGNOSIS — E66.01 CLASS 2 SEVERE OBESITY WITH SERIOUS COMORBIDITY AND BODY MASS INDEX (BMI) OF 35.0 TO 35.9 IN ADULT, UNSPECIFIED OBESITY TYPE (H): ICD-10-CM

## 2023-12-27 NOTE — TELEPHONE ENCOUNTER
Central Prior Authorization Team   Phone: [phone]    PA Initiation    Medication: Phentermine 37.5mg  Insurance Company: [employer] Non-Specialty PA's - Phone [phone] Fax [phone]  Pharmacy Filling the Rx: [facility] [address] [place] [address]  Filling Pharmacy Phone: [phone]  Filling Pharmacy Fax:    Start Date: 12/27/2023

## 2023-12-28 RX ORDER — PHENTERMINE HYDROCHLORIDE 37.5 MG/1
37.5 TABLET ORAL
Qty: 30 TABLET | Refills: 1 | OUTPATIENT
Start: 2023-12-28

## 2023-12-28 NOTE — TELEPHONE ENCOUNTER
Prior Authorization Approval    Authorization Effective Date: 11/27/2023  Authorization Expiration Date: 3/26/2024  Medication: Phentermine 37.5mg  Reference #:     Insurance Company: [employer] Non-Specialty PA's - Phone [phone] Fax [phone]  Which Pharmacy is filling the prescription (Not needed for infusion/clinic administered): [facility] [address] [place] [address]  Pharmacy Notified: Yes  Patient Notified: Instructed pharmacy to notify patient when script is ready to /ship.

## 2024-01-11 ENCOUNTER — MYC MEDICAL ADVICE (OUTPATIENT)
Dept: FAMILY MEDICINE | Facility: CLINIC | Age: 34
End: 2024-01-11
Payer: COMMERCIAL

## 2024-01-12 NOTE — TELEPHONE ENCOUNTER
"Patient wrote in the following this AM regarding phentermine:    \"Was unable to  the medication last time. Was told waiting for PA. Went again today 1/11/24 pharmacy said they never heard back from the doctor  office. I ended up paying out of pocket for it for this month. Where is the PA process situation? Will it be ok for next refill?      Thanks \"    From what I can tell here the PA is good to go right??    Paul Velasquez RN     [facility]    "

## 2024-01-12 NOTE — TELEPHONE ENCOUNTER
Starting 01/01/2024 insurance no longer covers medication.  Mediation is a plan exclusion on both her [employer] and [employer] insurance   Response from [employer]:          Response for [employer]

## 2024-01-12 NOTE — TELEPHONE ENCOUNTER
Pharmacy was left a voicemail on 12/28 with approval as well as notified of approval on CMM.  If they would have ran the medication before the first of the year they would have gotten a paid claim.  However, many insurances changed their formularies starting 01/01/2024 so it may just be excluded after the 1st of the year.  Will resent to insurance.

## 2024-01-12 NOTE — TELEPHONE ENCOUNTER
Patient notified via mychart encounter.    Paul Velasquez RN     [facility]

## 2024-03-10 DIAGNOSIS — E66.01 CLASS 2 SEVERE OBESITY WITH SERIOUS COMORBIDITY AND BODY MASS INDEX (BMI) OF 35.0 TO 35.9 IN ADULT, UNSPECIFIED OBESITY TYPE (H): ICD-10-CM

## 2024-03-10 DIAGNOSIS — E66.812 CLASS 2 SEVERE OBESITY WITH SERIOUS COMORBIDITY AND BODY MASS INDEX (BMI) OF 35.0 TO 35.9 IN ADULT, UNSPECIFIED OBESITY TYPE (H): ICD-10-CM

## 2024-03-11 RX ORDER — PHENTERMINE HYDROCHLORIDE 37.5 MG/1
1 TABLET ORAL
Qty: 30 TABLET | Refills: 0 | Status: SHIPPED | OUTPATIENT
Start: 2024-03-11 | End: 2024-04-09

## 2024-03-19 ENCOUNTER — OFFICE VISIT (OUTPATIENT)
Dept: FAMILY MEDICINE | Facility: CLINIC | Age: 34
End: 2024-03-19
Payer: COMMERCIAL

## 2024-03-19 VITALS
BODY MASS INDEX: 34.25 KG/M2 | RESPIRATION RATE: 16 BRPM | WEIGHT: 181.4 LBS | HEIGHT: 61 IN | SYSTOLIC BLOOD PRESSURE: 137 MMHG | OXYGEN SATURATION: 99 % | DIASTOLIC BLOOD PRESSURE: 94 MMHG | HEART RATE: 65 BPM

## 2024-03-19 DIAGNOSIS — E66.01 CLASS 2 SEVERE OBESITY WITH SERIOUS COMORBIDITY AND BODY MASS INDEX (BMI) OF 37.0 TO 37.9 IN ADULT, UNSPECIFIED OBESITY TYPE (H): Primary | ICD-10-CM

## 2024-03-19 DIAGNOSIS — E66.812 CLASS 2 SEVERE OBESITY WITH SERIOUS COMORBIDITY AND BODY MASS INDEX (BMI) OF 37.0 TO 37.9 IN ADULT, UNSPECIFIED OBESITY TYPE (H): Primary | ICD-10-CM

## 2024-03-19 PROCEDURE — 99214 OFFICE O/P EST MOD 30 MIN: CPT | Performed by: FAMILY MEDICINE

## 2024-03-19 RX ORDER — TOPIRAMATE 25 MG/1
25 TABLET, FILM COATED ORAL 2 TIMES DAILY
Qty: 60 TABLET | Refills: 3 | Status: SHIPPED | OUTPATIENT
Start: 2024-03-19 | End: 2024-09-10

## 2024-03-20 NOTE — PROGRESS NOTES
"    Return Medical Weight Management Note     Fortunato Dukes  MRN:  [id number]  :  1990  ROSHAN:  3/19/2024        I had the pleasure of seeing Fortunato Dukes. She is a 33 year old female who I am continuing to see for treatment of obesity related to:        2022    10:50 PM   --   I have the following health issues associated with obesity High Blood Pressure   I have the following symptoms associated with obesity Fatigue       INTERVAL HISTORY:  Pa comes in today for comprehensive weight management follow-up.  When I for started seeing her in 2022 she was at 198.  She was initially on Saxenda and did not have any weight loss.  She was then switched over to Wegovy and lost approximately 8 pounds.  When I saw her last, we added half a tab of phentermine.  She states that her insurance no longer covers the Wegovy and she has not had it since December.  She is just been taking half a tab of phentermine and she is actually lost about 9 pounds.  She feels like it has been effective in helping her make healthier choices and suppressing her appetite.  She still really good about exercising on a regular basis.  She is lost a total now of 18 pounds and she is feeling a little bit more hopeful that the phentermine seems to be working well for her.  At this point, we will add topiramate 25 mg.  Will have her start with 1 tablet for the first week and then can increase to twice daily if she is tolerating it well.  Reviewed potential adverse side effects.  She never had a kidney stone.    CURRENT WEIGHT:   181 lbs 6.4 oz                       No data to display                VITALS:  BP (!) 137/94   Pulse 65   Resp 16   Ht 1.549 m (5' 1\")   Wt 82.3 kg (181 lb 6.4 oz)   LMP  (LMP Unknown)   SpO2 99%   BMI 34.28 kg/m      MEDICATIONS:   Current Outpatient Medications   Medication Sig Dispense Refill    diltiazem ER (DILT-XR) 240 MG 24 hr ER beaded capsule Take 240 mg by mouth daily      indapamide (LOZOL) 2.5 " MG tablet Take 1 tablet by mouth daily      phentermine (ADIPEX-P) 37.5 MG tablet TAKE 1 TABLET BY MOUTH IN THE MORNING BEFORE BREAKFAST 30 tablet 0    topiramate (TOPAMAX) 25 MG tablet Take 1 tablet (25 mg) by mouth 2 times daily 60 tablet 3            No data to display                ASSESSMENT/Plan   1. Class 2 severe obesity with serious comorbidity and body mass index (BMI) of 37.0 to 37.9 in adult, unspecified obesity type (H)  Pa had really minimal weight loss with the Saxenda and Wegovy.  She now has had some additional weight loss with stopping the Wegovy and starting a half a tab of phentermine.  She did bump it up to a full tab and we will now add topiramate 25 mg twice daily.  She will continue with her regular workouts and good nutrition.  Follow-up in 12 weeks.  - topiramate (TOPAMAX) 25 MG tablet; Take 1 tablet (25 mg) by mouth 2 times daily  Dispense: 60 tablet; Refill: 3       FOLLOW-UP:    12 weeks.        Sincerely,    Karla Nix MD

## 2024-04-09 DIAGNOSIS — E66.01 CLASS 2 SEVERE OBESITY WITH SERIOUS COMORBIDITY AND BODY MASS INDEX (BMI) OF 35.0 TO 35.9 IN ADULT, UNSPECIFIED OBESITY TYPE (H): ICD-10-CM

## 2024-04-09 DIAGNOSIS — E66.812 CLASS 2 SEVERE OBESITY WITH SERIOUS COMORBIDITY AND BODY MASS INDEX (BMI) OF 35.0 TO 35.9 IN ADULT, UNSPECIFIED OBESITY TYPE (H): ICD-10-CM

## 2024-04-09 RX ORDER — PHENTERMINE HYDROCHLORIDE 37.5 MG/1
1 TABLET ORAL
Qty: 30 TABLET | Refills: 1 | Status: SHIPPED | OUTPATIENT
Start: 2024-04-09 | End: 2024-06-05

## 2024-06-04 DIAGNOSIS — E66.812 CLASS 2 SEVERE OBESITY WITH SERIOUS COMORBIDITY AND BODY MASS INDEX (BMI) OF 35.0 TO 35.9 IN ADULT, UNSPECIFIED OBESITY TYPE (H): ICD-10-CM

## 2024-06-04 DIAGNOSIS — E66.01 CLASS 2 SEVERE OBESITY WITH SERIOUS COMORBIDITY AND BODY MASS INDEX (BMI) OF 35.0 TO 35.9 IN ADULT, UNSPECIFIED OBESITY TYPE (H): ICD-10-CM

## 2024-06-05 RX ORDER — PHENTERMINE HYDROCHLORIDE 37.5 MG/1
1 TABLET ORAL
Qty: 30 TABLET | Refills: 0 | Status: SHIPPED | OUTPATIENT
Start: 2024-06-05 | End: 2024-07-09

## 2024-06-18 ENCOUNTER — OFFICE VISIT (OUTPATIENT)
Dept: FAMILY MEDICINE | Facility: CLINIC | Age: 34
End: 2024-06-18
Payer: COMMERCIAL

## 2024-06-18 VITALS
DIASTOLIC BLOOD PRESSURE: 85 MMHG | RESPIRATION RATE: 16 BRPM | OXYGEN SATURATION: 99 % | HEIGHT: 61 IN | HEART RATE: 71 BPM | BODY MASS INDEX: 34.74 KG/M2 | SYSTOLIC BLOOD PRESSURE: 125 MMHG | WEIGHT: 184 LBS

## 2024-06-18 DIAGNOSIS — E66.811 CLASS 1 OBESITY WITH SERIOUS COMORBIDITY AND BODY MASS INDEX (BMI) OF 34.0 TO 34.9 IN ADULT, UNSPECIFIED OBESITY TYPE: Primary | ICD-10-CM

## 2024-06-18 PROCEDURE — 99214 OFFICE O/P EST MOD 30 MIN: CPT | Performed by: FAMILY MEDICINE

## 2024-06-18 RX ORDER — METFORMIN HCL 500 MG
1000 TABLET, EXTENDED RELEASE 24 HR ORAL 2 TIMES DAILY WITH MEALS
Qty: 120 TABLET | Refills: 3 | Status: SHIPPED | OUTPATIENT
Start: 2024-06-18

## 2024-06-18 NOTE — PROGRESS NOTES
"    Return Medical Weight Management Note     Fortunato Dukes  MRN:  [id number]  :  1990  ROSHAN:  2024      I had the pleasure of seeing Fortunato Dukes. She is a 34 year old female who I am continuing to see for treatment of obesity related to:        2022    10:50 PM   --   I have the following health issues associated with obesity High Blood Pressure   I have the following symptoms associated with obesity Fatigue       INTERVAL HISTORY:  Pa returns for comprehensive weight management follow-up.  Her intake weight in 2022 was 198.  She was originally on Saxenda and really did not have much in the way of weight loss.  She was then changed over to Wegovy and lost about 8 pounds but then plateaued and her insurance stopped covering it.  We then switched her over to phentermine and she initially lost about 7 pounds but now has had about 3 pound weight regain since I saw her last.  We added topiramate 25 mg twice daily last time and she felt like it was not helpful.  She does feel like the phentermine helps with her appetite.  She still tracking and she states she comes in at about 14 to 1600 naresh/day.  She does not specifically know her proteins but it sounds like she does not have breakfast and then she has a protein shake for lunch and then she feels like she has a fairly sensible dinner.  She is still exercising and working out regularly.  It is unclear to me why she has been so resistant to weight loss.  Will try adding metformin.  Discussed potential adverse side effects.  She will slowly titrate this up and then we will see her back in 12 weeks.  We could potentially have her see a dietitian.    CURRENT WEIGHT:   184 lbs 0 oz                       No data to display                VITALS:  /85   Pulse 71   Resp 16   Ht 1.549 m (5' 1\")   Wt 83.5 kg (184 lb)   LMP  (LMP Unknown)   SpO2 99%   BMI 34.77 kg/m      MEDICATIONS:   Current Outpatient Medications   Medication Sig Dispense " Refill    metFORMIN (GLUCOPHAGE XR) 500 MG 24 hr tablet Take 2 tablets (1,000 mg) by mouth 2 times daily (with meals) 120 tablet 3    diltiazem ER (DILT-XR) 240 MG 24 hr ER beaded capsule Take 240 mg by mouth daily      indapamide (LOZOL) 2.5 MG tablet Take 1 tablet by mouth daily      phentermine (ADIPEX-P) 37.5 MG tablet TAKE 1 TABLET BY MOUTH IN THE MORNING BEFORE BREAKFAST 30 tablet 0    topiramate (TOPAMAX) 25 MG tablet Take 1 tablet (25 mg) by mouth 2 times daily 60 tablet 3            No data to display                ASSESSMENT/Plan  1. Class 1 obesity with serious comorbidity and body mass index (BMI) of 34.0 to 34.9 in adult, unspecified obesity type  Pa has now plateaued with the phentermine.  She had no response to topiramate so this will be stopped.  Will switch over to metformin.  She did not respond to Saxenda and had just 8 pound weight loss with Wegovy.  Her nutrition seems pretty good and she is exercising regularly.  Have her follow-up in 12 weeks.  - metFORMIN (GLUCOPHAGE XR) 500 MG 24 hr tablet; Take 2 tablets (1,000 mg) by mouth 2 times daily (with meals)  Dispense: 120 tablet; Refill: 3      FOLLOW-UP:    12 weeks.        Sincerely,    Karla Nix MD

## 2024-07-09 ENCOUNTER — TELEPHONE (OUTPATIENT)
Dept: FAMILY MEDICINE | Facility: CLINIC | Age: 34
End: 2024-07-09
Payer: COMMERCIAL

## 2024-07-09 DIAGNOSIS — E66.01 CLASS 2 SEVERE OBESITY WITH SERIOUS COMORBIDITY AND BODY MASS INDEX (BMI) OF 35.0 TO 35.9 IN ADULT, UNSPECIFIED OBESITY TYPE (H): ICD-10-CM

## 2024-07-09 DIAGNOSIS — E66.812 CLASS 2 SEVERE OBESITY WITH SERIOUS COMORBIDITY AND BODY MASS INDEX (BMI) OF 35.0 TO 35.9 IN ADULT, UNSPECIFIED OBESITY TYPE (H): ICD-10-CM

## 2024-07-09 RX ORDER — PHENTERMINE HYDROCHLORIDE 37.5 MG/1
1 TABLET ORAL
Qty: 30 TABLET | Refills: 1 | Status: SHIPPED | OUTPATIENT
Start: 2024-07-09

## 2024-07-12 NOTE — TELEPHONE ENCOUNTER
Central Prior Authorization Team - Phone: [phone]     PA Initiation    Medication: PHENTERMINE HCL 37.5 MG PO TABS  Insurance Company: [employer] - Phone [phone] Fax [phone]  Pharmacy Filling the Rx: [facility] [address] [place] [address]  Filling Pharmacy Phone: [phone]  Filling Pharmacy Fax:    Start Date: 7/12/2024

## 2024-07-16 NOTE — TELEPHONE ENCOUNTER
Central Prior Authorization Team - Phone: [phone]     PRIOR AUTHORIZATION DENIED    Medication: PHENTERMINE HCL 37.5 MG PO TABS  Insurance Company: [employer] - Phone [phone] Fax [phone]  Denial Date: 7/12/2024    Denial Reason(s):         Appeal Information: If the provider would like to appeal, please provide a letter of medical necessity and route back to the team. Otherwise you can close the encounter. Thank you, Central PA Team    Patient Notified: NO  Unfortunately, we cannot call the patient with denials because we do not know what next steps the MD will take nor can we give medical advice, please notify the patient of what they are to expect for the continuation of their therapy from the provider.

## 2024-09-10 ENCOUNTER — OFFICE VISIT (OUTPATIENT)
Dept: FAMILY MEDICINE | Facility: CLINIC | Age: 34
End: 2024-09-10
Payer: COMMERCIAL

## 2024-09-10 VITALS
SYSTOLIC BLOOD PRESSURE: 136 MMHG | RESPIRATION RATE: 16 BRPM | OXYGEN SATURATION: 97 % | HEART RATE: 71 BPM | DIASTOLIC BLOOD PRESSURE: 85 MMHG | BODY MASS INDEX: 35.57 KG/M2 | WEIGHT: 188.4 LBS | HEIGHT: 61 IN

## 2024-09-10 DIAGNOSIS — E66.812 CLASS 2 SEVERE OBESITY WITH SERIOUS COMORBIDITY AND BODY MASS INDEX (BMI) OF 35.0 TO 35.9 IN ADULT, UNSPECIFIED OBESITY TYPE (H): Primary | ICD-10-CM

## 2024-09-10 DIAGNOSIS — E66.01 CLASS 2 SEVERE OBESITY WITH SERIOUS COMORBIDITY AND BODY MASS INDEX (BMI) OF 35.0 TO 35.9 IN ADULT, UNSPECIFIED OBESITY TYPE (H): Primary | ICD-10-CM

## 2024-09-10 PROCEDURE — G2211 COMPLEX E/M VISIT ADD ON: HCPCS | Performed by: FAMILY MEDICINE

## 2024-09-10 PROCEDURE — 99213 OFFICE O/P EST LOW 20 MIN: CPT | Performed by: FAMILY MEDICINE

## 2024-09-10 RX ORDER — NALTREXONE HYDROCHLORIDE 50 MG/1
TABLET, FILM COATED ORAL
Qty: 30 TABLET | Refills: 3 | Status: SHIPPED | OUTPATIENT
Start: 2024-09-10

## 2024-09-10 RX ORDER — BUPROPION HYDROCHLORIDE 150 MG/1
150 TABLET ORAL EVERY MORNING
Qty: 30 TABLET | Refills: 2 | Status: SHIPPED | OUTPATIENT
Start: 2024-09-10

## 2024-09-11 NOTE — PROGRESS NOTES
Return Medical Weight Management Note     Fortunato Dukes  MRN:  [id number]  :  1990  ROSHAN:  9/10/2024        I had the pleasure of seeing  Fortunato Dukes. She is a 34 year old female who I am continuing to see for treatment of obesity related to:        2022    10:50 PM   --   I have the following health issues associated with obesity High Blood Pressure   I have the following symptoms associated with obesity Fatigue       INTERVAL HISTORY:  Pa returns for comprehensive weight management follow-up.  Her intake weight in 2022 was 198.  When I saw her last she was down to 184.  Her history is that she started with Saxenda and did not lose any weight.  She was unable to get Wegovy and lost about 8 pounds.  Then her insurance stopped covering it.  She was switched over to phentermine and lost an additional 7 pounds.  She then was stuck at a plateau.  We added topiramate and it was not helpful so I had her stop it.  The last time I saw her we added metformin and she actually has gained 4 pounds and is now up to 188.  She feels like she still eating very healthy.  She skips breakfast and then has a protein shake.  She feels like she has a sensible dinner.  She does feel like she has had more celebrations and family get together since I last saw her and that might be the cause for the weight gain.  She exercises regularly.  It has been a little bit of a mystery why she has not responded with weight loss consistently.  She does want to try other medications and so we will have her stop the metformin as she was getting some swelling in her hands that she thinks is related to it.  Will have her start Wellbutrin 150 mg extended release.  Will have her add topiramate 25 mg the following week if she is tolerating it well and if that is going well she can increase to twice daily.  I reviewed all potential adverse side effects with her.  To then follow-up in 8 weeks.  The longitudinal plan of care for the  "diagnosis(es)/condition(s) as documented were addressed during this visit. Due to the added complexity in care, I will continue to support Pa in the subsequent management and with ongoing continuity of care.   CURRENT WEIGHT:   188 lbs 6.4 oz                       No data to display                VITALS:  /85   Pulse 71   Resp 16   Ht 1.549 m (5' 1\")   Wt 85.5 kg (188 lb 6.4 oz)   LMP  (LMP Unknown)   SpO2 97%   BMI 35.60 kg/m      MEDICATIONS:   Current Outpatient Medications   Medication Sig Dispense Refill    buPROPion (WELLBUTRIN XL) 150 MG 24 hr tablet Take 1 tablet (150 mg) by mouth every morning. 30 tablet 2    diltiazem ER (DILT-XR) 240 MG 24 hr ER beaded capsule Take 240 mg by mouth daily      indapamide (LOZOL) 2.5 MG tablet Take 1 tablet by mouth daily      metFORMIN (GLUCOPHAGE XR) 500 MG 24 hr tablet Take 2 tablets (1,000 mg) by mouth 2 times daily (with meals) 120 tablet 3    naltrexone (DEPADE/REVIA) 50 MG tablet Take 1/2 tab twice daily 30 tablet 3    phentermine (ADIPEX-P) 37.5 MG tablet TAKE 1 TABLET BY MOUTH IN THE MORNING BEFORE BREAKFAST 30 tablet 1            No data to display                ASSESSMENT/Plan  1. Class 2 severe obesity with serious comorbidity and body mass index (BMI) of 35.0 to 35.9 in adult, unspecified obesity type (H)  Pa has struggled with consistent weight loss.  She did not respond to Saxenda.  She did have some minimal weight loss from Wegovy but then her insurance stopped covering it.  She lost some additional weight with phentermine but then plateaued.  She had no additional response to topiramate so that was stopped.  She had side effects from metformin so that was stopped.  Will add Wellbutrin/naltrexone combination as discussed above.  Follow-up in 6 to 8 weeks.  - buPROPion (WELLBUTRIN XL) 150 MG 24 hr tablet; Take 1 tablet (150 mg) by mouth every morning.  Dispense: 30 tablet; Refill: 2  - naltrexone (DEPADE/REVIA) 50 MG tablet; Take 1/2 tab twice " daily  Dispense: 30 tablet; Refill: 3    FOLLOW-UP:    8 weeks.        Sincerely,    Karla Nix MD

## 2024-10-21 ENCOUNTER — VIRTUAL VISIT (OUTPATIENT)
Dept: FAMILY MEDICINE | Facility: CLINIC | Age: 34
End: 2024-10-21
Payer: COMMERCIAL

## 2024-10-21 DIAGNOSIS — I10 ESSENTIAL HYPERTENSION: Primary | ICD-10-CM

## 2024-10-21 DIAGNOSIS — E66.812 CLASS 2 SEVERE OBESITY WITH SERIOUS COMORBIDITY AND BODY MASS INDEX (BMI) OF 35.0 TO 35.9 IN ADULT, UNSPECIFIED OBESITY TYPE (H): ICD-10-CM

## 2024-10-21 DIAGNOSIS — E66.01 CLASS 2 SEVERE OBESITY WITH SERIOUS COMORBIDITY AND BODY MASS INDEX (BMI) OF 35.0 TO 35.9 IN ADULT, UNSPECIFIED OBESITY TYPE (H): ICD-10-CM

## 2024-10-21 PROCEDURE — G2211 COMPLEX E/M VISIT ADD ON: HCPCS | Mod: 95 | Performed by: FAMILY MEDICINE

## 2024-10-21 PROCEDURE — 99213 OFFICE O/P EST LOW 20 MIN: CPT | Mod: 95 | Performed by: FAMILY MEDICINE

## 2024-10-21 RX ORDER — METFORMIN HYDROCHLORIDE 500 MG/1
1000 TABLET, EXTENDED RELEASE ORAL 2 TIMES DAILY WITH MEALS
Qty: 120 TABLET | Refills: 3 | Status: SHIPPED | OUTPATIENT
Start: 2024-10-21

## 2024-10-21 RX ORDER — PHENTERMINE HYDROCHLORIDE 37.5 MG/1
1 TABLET ORAL
Qty: 30 TABLET | Refills: 1 | Status: SHIPPED | OUTPATIENT
Start: 2024-10-21

## 2024-10-21 NOTE — PROGRESS NOTES
Pa is a 34 year old who is being evaluated via a billable video visit.    How would you like to obtain your AVS? MyChart  If the video visit is dropped, the invitation should be resent by: Text to cell phone: [phone]  Will anyone else be joining your video visit? No      1. Class 2 severe obesity with serious comorbidity and body mass index (BMI) of 35.0 to 35.9 in adult, unspecified obesity type (H)  Pa would like to try going back to phentermine and metformin.  She had some weight gain with Wellbutrin.  She previously did not respond to Saxenda topiramate.  She had side effects from naltrexone.  In the past she was not responding very well to phentermine so I had her stop but she would like to try it again.  Will then follow-up in 8 to 12 weeks and see if she has made any progress.  - metFORMIN (GLUCOPHAGE XR) 500 MG 24 hr tablet; Take 2 tablets (1,000 mg) by mouth 2 times daily (with meals).  Dispense: 120 tablet; Refill: 3  - phentermine (ADIPEX-P) 37.5 MG tablet; Take 1 tablet (37.5 mg) by mouth every morning (before breakfast).  Dispense: 30 tablet; Refill: 1    2. Essential hypertension  Under good control      Subjective   Pa is a 34 year old, presenting for the following health issues:  Weight Loss  Pa returns for comprehensive weight management follow-up.  Her intake weight in November 2022 was 198.  She was originally on Saxenda and really did not have much in the way of weight loss.  She was then changed over to Wegovy and lost about 8 pounds but then plateaued and her insurance stopped covering it.  We then switched her over to phentermine and she initially lost about 7 pounds but then had  about 3 pound weight regain.  We added topiramate 25 mg twice daily  and she felt like it was not helpful.  When I saw her last, she really was not responding to the phentermine anymore and she felt like she was having swelling from the metformin.  Therefore I switched her over to Wellbutrin and then added  naltrexone once that was stable.  She states she had nausea and vomiting from the naltrexone so she stopped taking it.  She is actually gained weight and is now up to 194, up from 184 last time.  We discussed all the options she is already tried for oral medication and her insurance does not cover the GLP-1's.  We reviewed her dietary history and she still feels like she has been good about her nutrition with total calories coming in [id number] naresh with a good amount of protein.  She is still exercising regularly.  Will see at follow-up when I see her she may need to cut back calories at this point as she does not seem to be responding to any medications as far as weight loss.  The longitudinal plan of care for the diagnosis(es)/condition(s) as documented were addressed during this visit. Due to the added complexity in care, I will continue to support Pa in the subsequent management and with ongoing continuity of care.       10/21/2024     2:12 PM   Additional Questions   Roomed by Danay DUFF CMA     Video Start Time:  2:40    History of Present Illness       Reason for visit:  Weight    She eats 2-3 servings of fruits and vegetables daily.She consumes 1 sweetened beverage(s) daily.She exercises with enough effort to increase her heart rate 30 to 60 minutes per day.  She exercises with enough effort to increase her heart rate 3 or less days per week.   She is taking medications regularly.                   Objective    Vitals - Patient Reported  Weight (Patient Reported): 88 kg (194 lb)        Physical Exam   GENERAL: alert and no distress  PSYCH: Appropriate affect, tone, and pace of words          Video-Visit Details    Type of service:  Video Visit   Video End Time: 2:55  Originating Location (pt. Location): Home    Distant Location (provider location):  Off-site  Platform used for Video Visit: Anette  Signed Electronically by: Karla Nix MD

## 2024-10-22 ENCOUNTER — TELEPHONE (OUTPATIENT)
Dept: FAMILY MEDICINE | Facility: CLINIC | Age: 34
End: 2024-10-22
Payer: COMMERCIAL

## 2024-10-22 NOTE — TELEPHONE ENCOUNTER
[facility] pharmacy faxed a note to Dr. Nix inquiring about Phentermine 37.5 mg. This medication is typically prescribed for short-term use, and the patient has been using it for less than 12 weeks. Are there any plans to wean the patient off the medication or consider alternative therapies? Additionally, has the patient tried any other weight management options prior to this? We need this information before dispensing.

## 2024-10-23 NOTE — TELEPHONE ENCOUNTER
Is standard care and bariatrics use phentermine longer than the 12-week FDA approval as an off label medication.  There is good 3-year safety data.  Yes this patient has tried multiple medications including Wellbutrin, naltrexone, metformin, topiramate, and Wegovy.

## 2024-12-15 ENCOUNTER — HEALTH MAINTENANCE LETTER (OUTPATIENT)
Age: 34
End: 2024-12-15

## 2024-12-18 DIAGNOSIS — E66.01 CLASS 2 SEVERE OBESITY WITH SERIOUS COMORBIDITY AND BODY MASS INDEX (BMI) OF 35.0 TO 35.9 IN ADULT, UNSPECIFIED OBESITY TYPE (H): ICD-10-CM

## 2024-12-18 DIAGNOSIS — E66.812 CLASS 2 SEVERE OBESITY WITH SERIOUS COMORBIDITY AND BODY MASS INDEX (BMI) OF 35.0 TO 35.9 IN ADULT, UNSPECIFIED OBESITY TYPE (H): ICD-10-CM

## 2024-12-18 RX ORDER — PHENTERMINE HYDROCHLORIDE 37.5 MG/1
1 TABLET ORAL
Qty: 30 TABLET | Refills: 1 | Status: SHIPPED | OUTPATIENT
Start: 2024-12-18

## 2025-02-06 ENCOUNTER — OFFICE VISIT (OUTPATIENT)
Dept: FAMILY MEDICINE | Facility: CLINIC | Age: 35
End: 2025-02-06
Payer: COMMERCIAL

## 2025-02-06 VITALS
DIASTOLIC BLOOD PRESSURE: 88 MMHG | HEART RATE: 75 BPM | SYSTOLIC BLOOD PRESSURE: 137 MMHG | HEIGHT: 61 IN | OXYGEN SATURATION: 100 % | BODY MASS INDEX: 34.85 KG/M2 | RESPIRATION RATE: 16 BRPM | WEIGHT: 184.6 LBS

## 2025-02-06 DIAGNOSIS — E66.811 CLASS 1 OBESITY WITH SERIOUS COMORBIDITY AND BODY MASS INDEX (BMI) OF 34.0 TO 34.9 IN ADULT, UNSPECIFIED OBESITY TYPE: Primary | ICD-10-CM

## 2025-02-06 DIAGNOSIS — I10 ESSENTIAL HYPERTENSION: ICD-10-CM

## 2025-02-06 PROBLEM — Z11.4 SCREENING FOR HIV (HUMAN IMMUNODEFICIENCY VIRUS): Status: ACTIVE | Noted: 2025-02-06

## 2025-02-06 RX ORDER — METFORMIN HYDROCHLORIDE 500 MG/1
1000 TABLET, EXTENDED RELEASE ORAL 2 TIMES DAILY WITH MEALS
Qty: 120 TABLET | Refills: 3 | Status: SHIPPED | OUTPATIENT
Start: 2025-02-06

## 2025-02-06 RX ORDER — PHENTERMINE HYDROCHLORIDE 37.5 MG/1
1 TABLET ORAL
Qty: 30 TABLET | Refills: 1 | Status: SHIPPED | OUTPATIENT
Start: 2025-02-06

## 2025-02-06 NOTE — PROGRESS NOTES
"    Return Medical Weight Management Note     Fortunato Dukes  MRN:  [id number]  :  1990  ROSHAN:  2025      I had the pleasure of seeing Fortunato Dukes. She is a 34 year old female who I am continuing to see for treatment of obesity related to:        2022    10:50 PM   --   I have the following health issues associated with obesity High Blood Pressure   I have the following symptoms associated with obesity Fatigue       INTERVAL HISTORY:  Pa returns for comprehensive weight management follow-up.  Her intake weight in  was 198.  When I saw her last she was at 194 and today she is at 184.  She had previously tried medication and did not have much of a response including Saxenda, topiramate, and phentermine.  She had side effects from naltrexone.  She did have a little bit of weight loss with Wegovy but then her insurance stopped covering it.  When I saw her last she wanted to restart phentermine and metformin combination.  She is lost 10 pounds since then which is the most that she is lost so far so she is feeling good about that.  She feels like she is having less stressors so she has been able to focus more on a healthier diet.  She still exercising regularly.  She does check her blood pressures at home and they are usually in the normal range.  The longitudinal plan of care for the diagnosis(es)/condition(s) as documented were addressed during this visit. Due to the added complexity in care, I will continue to support Pa in the subsequent management and with ongoing continuity of care.   CURRENT WEIGHT:   184 lbs 9.6 oz                       No data to display                VITALS:  /88   Pulse 75   Resp 16   Ht 1.549 m (5' 1\")   Wt 83.7 kg (184 lb 9.6 oz)   LMP  (LMP Unknown)   SpO2 100%   BMI 34.88 kg/m      MEDICATIONS:   Current Outpatient Medications   Medication Sig Dispense Refill    diltiazem ER (DILT-XR) 240 MG 24 hr ER beaded capsule Take 240 mg by mouth daily      indapamide " (LOZOL) 2.5 MG tablet Take 1 tablet by mouth daily      metFORMIN (GLUCOPHAGE XR) 500 MG 24 hr tablet Take 2 tablets (1,000 mg) by mouth 2 times daily (with meals). 120 tablet 3    phentermine (ADIPEX-P) 37.5 MG tablet Take 1 tablet (37.5 mg) by mouth every morning (before breakfast). 30 tablet 1            No data to display                ASSESSMENT/Plan  1. Class 1 obesity with serious comorbidity and body mass index (BMI) of 34.0 to 34.9 in adult, unspecified obesity type (Primary)  She has now had a 10 pound weight loss since restarting phentermine and metformin.  She is really trying to focus on good nutrition and activity.  Refills are sent and we will see her back in 3 months.  - phentermine (ADIPEX-P) 37.5 MG tablet; Take 1 tablet (37.5 mg) by mouth every morning (before breakfast).  Dispense: 30 tablet; Refill: 1  - metFORMIN (GLUCOPHAGE XR) 500 MG 24 hr tablet; Take 2 tablets (1,000 mg) by mouth 2 times daily (with meals).  Dispense: 120 tablet; Refill: 3    2. Essential hypertension  Under good control with current medications.      FOLLOW-UP:    12 weeks.      Sincerely,    Karla Nix MD

## 2025-02-06 NOTE — PROGRESS NOTES
Prior to immunization administration, verified patients identity using patient s name and date of birth. Please see Immunization Activity for additional information.     Screening Questionnaire for Adult Immunization    Are you sick today?   No   Do you have allergies to medications, food, a vaccine component or latex?   No   Have you ever had a serious reaction after receiving a vaccination?   No   Do you have a long-term health problem with heart, lung, kidney, or metabolic disease (e.g., diabetes), asthma, a blood disorder, no spleen, complement component deficiency, a cochlear implant, or a spinal fluid leak?  Are you on long-term aspirin therapy?   No   Do you have cancer, leukemia, HIV/AIDS, or any other immune system problem?   No   Do you have a parent, brother, or sister with an immune system problem?   No   In the past 3 months, have you taken medications that affect  your immune system, such as prednisone, other steroids, or anticancer drugs; drugs for the treatment of rheumatoid arthritis, Crohn s disease, or psoriasis; or have you had radiation treatments?   No   Have you had a seizure, or a brain or other nervous system problem?   No   During the past year, have you received a transfusion of blood or blood    products, or been given immune (gamma) globulin or antiviral drug?   No   For women: Are you pregnant or is there a chance you could become       pregnant during the next month?   No   Have you received any vaccinations in the past 4 weeks?   No     Immunization questionnaire answers were all negative.      Patient instructed to remain in clinic for 15 minutes afterwards, and to report any adverse reactions.     Screening performed by Yolanda Dumont MA on 2/6/2025 at 2:03 PM.

## 2025-04-14 DIAGNOSIS — E66.811 CLASS 1 OBESITY WITH SERIOUS COMORBIDITY AND BODY MASS INDEX (BMI) OF 34.0 TO 34.9 IN ADULT, UNSPECIFIED OBESITY TYPE: ICD-10-CM

## 2025-04-15 RX ORDER — PHENTERMINE HYDROCHLORIDE 37.5 MG/1
1 TABLET ORAL
Qty: 30 TABLET | Refills: 0 | Status: SHIPPED | OUTPATIENT
Start: 2025-04-15

## 2025-04-17 ENCOUNTER — TELEPHONE (OUTPATIENT)
Dept: FAMILY MEDICINE | Facility: CLINIC | Age: 35
End: 2025-04-17
Payer: COMMERCIAL

## 2025-04-17 NOTE — TELEPHONE ENCOUNTER
Yes, she is to continue.  It is common to use this much longer than 12 weeks as off label.  Is there a reason they are questioning this?  Do they question every off label medication?

## 2025-04-17 NOTE — TELEPHONE ENCOUNTER
Per pharmacy they want a call back to let us know if it is ok to have her on phentermine more than 12 weeks.   Pt has been using for >12 weeks which exceeds the recommended duration. Is there a plan to discontinue? Is this still indicated for patient?

## 2025-05-19 ENCOUNTER — VIRTUAL VISIT (OUTPATIENT)
Dept: FAMILY MEDICINE | Facility: CLINIC | Age: 35
End: 2025-05-19
Payer: COMMERCIAL

## 2025-05-19 DIAGNOSIS — I10 ESSENTIAL HYPERTENSION: ICD-10-CM

## 2025-05-19 DIAGNOSIS — E66.811 CLASS 1 OBESITY WITH SERIOUS COMORBIDITY AND BODY MASS INDEX (BMI) OF 34.0 TO 34.9 IN ADULT, UNSPECIFIED OBESITY TYPE: Primary | ICD-10-CM

## 2025-05-19 PROCEDURE — 98005 SYNCH AUDIO-VIDEO EST LOW 20: CPT | Performed by: FAMILY MEDICINE

## 2025-05-19 NOTE — PROGRESS NOTES
Pa is a 34 year old who is being evaluated via a billable video visit.    How would you like to obtain your AVS? MyChart  If the video visit is dropped, the invitation should be resent by:   Will anyone else be joining your video visit? No      1. Class 1 obesity with serious comorbidity and body mass index (BMI) of 34.0 to 34.9 in adult, unspecified obesity type (Primary)  Pa is following up virtually for comprehensive weight management.  She is now lost a total of 12 pounds with phentermine and metformin.  She feels like she is also coming down the size and her quality so she is happy with the results even though they are fairly slow.  She would like to keep on the current medications as she is not having any adverse side effects.  Will then follow-up again in 12 weeks.    2. Essential hypertension  She has followed with another provider for her hypertension and they have changed her medication around.  She has been checking it at work and it sounds good range.      Subjective   Pa is a 34 year old, presenting for the following health issues:  Weight Loss        5/19/2025     1:43 PM   Additional Questions   Roomed by      Video Start Time: 1:45    History of Present Illness       Reason for visit:  Med & weight check    She eats 2-3 servings of fruits and vegetables daily.She consumes 1 sweetened beverage(s) daily.She exercises with enough effort to increase her heart rate 30 to 60 minutes per day.  She exercises with enough effort to increase her heart rate 4 days per week.   She is taking medications regularly.      She presents virtually for comprehensive weight management follow-up.  Her intake weight in November 2022 was 198.  When I saw her last in 225 she was at 184 and today she is at 182.  She is lost now a total of 14 pounds with restarting phentermine and taking metformin.  In the past she had tried Saxenda, phentermine and topiramate and did not have a lot of results.  She did lose some weight on  Wegovy but then her insurance stopped covering it.  She had side effects from naltrexone.  At the beginning of this year, she reestablished and she wanted to restart the phentermine and metformin combination.  She has been trying to be better with strength training and watching her portions.  She does feel like she has come down with cold anxiety even though it only looks like she is lost 2 pounds since I saw her last.  The longitudinal plan of care for the diagnosis(es)/condition(s) as documented were addressed during this visit. Due to the added complexity in care, I will continue to support Pa in the subsequent management and with ongoing continuity of care.               Objective    Vitals - Patient Reported  Weight (Patient Reported): 82.6 kg (182 lb)      Vitals:  No vitals were obtained today due to virtual visit.    Physical Exam   GENERAL: alert and no distress  PSYCH: Appropriate affect, tone, and pace of words          Video-Visit Details    Type of service:  Video Visit   Video End Time:1:57 PM  Originating Location (pt. Location): Home    Distant Location (provider location):  Off-site  Platform used for Video Visit: Anette  Signed Electronically by: Karla Nix MD

## 2025-06-12 DIAGNOSIS — E66.811 CLASS 1 OBESITY WITH SERIOUS COMORBIDITY AND BODY MASS INDEX (BMI) OF 34.0 TO 34.9 IN ADULT, UNSPECIFIED OBESITY TYPE: ICD-10-CM

## 2025-06-12 RX ORDER — PHENTERMINE HYDROCHLORIDE 37.5 MG/1
1 TABLET ORAL
Qty: 30 TABLET | Refills: 2 | Status: SHIPPED | OUTPATIENT
Start: 2025-06-12